# Patient Record
Sex: MALE | Race: AMERICAN INDIAN OR ALASKA NATIVE
[De-identification: names, ages, dates, MRNs, and addresses within clinical notes are randomized per-mention and may not be internally consistent; named-entity substitution may affect disease eponyms.]

---

## 2019-11-03 ENCOUNTER — HOSPITAL ENCOUNTER (EMERGENCY)
Dept: HOSPITAL 5 - ED | Age: 24
Discharge: HOME | End: 2019-11-03
Payer: SELF-PAY

## 2019-11-03 VITALS — DIASTOLIC BLOOD PRESSURE: 60 MMHG | SYSTOLIC BLOOD PRESSURE: 128 MMHG

## 2019-11-03 DIAGNOSIS — L03.211: Primary | ICD-10-CM

## 2019-11-03 DIAGNOSIS — Z79.899: ICD-10-CM

## 2019-11-03 DIAGNOSIS — Z79.1: ICD-10-CM

## 2019-11-03 PROCEDURE — 99282 EMERGENCY DEPT VISIT SF MDM: CPT

## 2019-11-03 NOTE — EMERGENCY DEPARTMENT REPORT
ED General Adult HPI





- General


Chief complaint: Dental/Oral


Stated complaint: LFT SIDE FACE SWELLING/PAIN


Time Seen by Provider: 11/03/19 10:51


Source: patient


Mode of arrival: Ambulatory


Limitations: No Limitations





- History of Present Illness


Initial comments: 





24-year-old male presents to ED with complaint of facial swelling.  Patient 

states 2 days ago he started with a bump on the left side of his chin.  Patient 

states this area has gotten progressively larger.  Patient denies fever.  Denies

insect bite.  Patient states he was seen at an urgent care and advised to come 

to the emergency room.


-: days(s) (2)


Location: face


Severity scale (0 -10): 6


Consistency: constant


Improves with: none


Worsens with: none


Associated Symptoms: denies: fever/chills


Treatments Prior to Arrival: none





- Related Data


                                  Previous Rx's











 Medication  Instructions  Recorded  Last Taken  Type


 


Acetaminophen/Codeine [Tylenol #3] 1 tab PO Q6H PRN #14 tab 08/09/15 Unknown Rx


 


Cyclobenzaprine [Flexeril 10 MG 10 mg PO TID PRN #20 tablet 08/09/15 Unknown Rx





TAB]    


 


Ibuprofen [Motrin 800 MG tab] 800 mg PO Q8HR PRN #30 tablet 08/09/15 Unknown Rx


 


Naproxen [Naprosyn] 500 mg PO BID #20 tablet 11/03/19 Unknown Rx


 


Sulfamethoxazole/Trimethoprim 1 each PO BID 10 Days #20 tablet 11/03/19 Unknown 

Rx





[Bactrim DS TAB]    


 


cephALEXin [Keflex] 500 mg PO Q12HR 10 Days #20 cap 11/03/19 Unknown Rx











                                    Allergies











Allergy/AdvReac Type Severity Reaction Status Date / Time


 


No Known Allergies Allergy   Unverified 08/09/15 10:32














ED Review of Systems


ROS: 


Stated complaint: LFT SIDE FACE SWELLING/PAIN


Other details as noted in HPI





Comment: All other systems reviewed and negative


Constitutional: denies: fever


Skin: as per HPI





ED Past Medical Hx





- Past Medical History


Previous Medical History?: No





- Surgical History


Past Surgical History?: No





- Social History


Smoking Status: Never Smoker


Substance Use Type: None





- Medications


Home Medications: 


                                Home Medications











 Medication  Instructions  Recorded  Confirmed  Last Taken  Type


 


Acetaminophen/Codeine [Tylenol #3] 1 tab PO Q6H PRN #14 tab 08/09/15  Unknown Rx


 


Cyclobenzaprine [Flexeril 10 MG 10 mg PO TID PRN #20 tablet 08/09/15  Unknown Rx





TAB]     


 


Ibuprofen [Motrin 800 MG tab] 800 mg PO Q8HR PRN #30 tablet 08/09/15  Unknown Rx


 


Naproxen [Naprosyn] 500 mg PO BID #20 tablet 11/03/19  Unknown Rx


 


Sulfamethoxazole/Trimethoprim 1 each PO BID 10 Days #20 tablet 11/03/19  Unknown

 Rx





[Bactrim DS TAB]     


 


cephALEXin [Keflex] 500 mg PO Q12HR 10 Days #20 cap 11/03/19  Unknown Rx














ED Physical Exam





- General


Limitations: No Limitations


General appearance: alert, in no apparent distress





- Head


Head exam: Present: atraumatic, normocephalic





- Eye


Eye exam: Present: normal appearance





- ENT


ENT exam: Present: mucous membranes moist





- Neck


Neck exam: Present: normal inspection





- Respiratory


Respiratory exam: Present: normal lung sounds bilaterally.  Absent: respiratory 

distress





- Cardiovascular


Cardiovascular Exam: Present: regular rate, normal rhythm





- GI/Abdominal


GI/Abdominal exam: Absent: distended





- Extremities Exam


Extremities exam: Present: normal inspection





- Neurological Exam


Neurological exam: Present: alert, oriented X3





- Psychiatric


Psychiatric exam: Present: normal affect, normal mood





- Skin


Skin exam: Present: other (quarter-sized area of induration and swelling, 

tenderness to the left side of the patient's chin)





ED Course


                                   Vital Signs











  11/03/19 11/03/19





  09:47 11:40


 


Temperature 97.9 F 97.9 F


 


Pulse Rate 89 89


 


Respiratory 18 18





Rate  


 


Blood Pressure  128/60


 


Blood Pressure 128/60 





[Left]  


 


O2 Sat by Pulse 98 98





Oximetry  











Critical care attestation.: 


If time is entered above; I have spent that time in minutes in the direct care 

of this critically ill patient, excluding procedure time.








ED Disposition


Clinical Impression: 


 Folliculitis, Cellulitis





Disposition: DC-01 TO HOME OR SELFCARE


Is pt being admited?: No


Condition: Stable


Instructions:  Cellulitis (ED), Folliculitis (ED)


Prescriptions: 


Sulfamethoxazole/Trimethoprim [Bactrim DS TAB] 1 each PO BID 10 Days #20 tablet


cephALEXin [Keflex] 500 mg PO Q12HR 10 Days #20 cap


Naproxen [Naprosyn] 500 mg PO BID #20 tablet


Referrals: 


[facility] [Provider Group] - 3-5 Days


Time of Disposition: 11:03